# Patient Record
(demographics unavailable — no encounter records)

---

## 2024-11-01 NOTE — HISTORY OF PRESENT ILLNESS
[FreeTextEntry6] : cough, 4 days, no fever m sore throat , runny nose,, stuffy nose  mild bronchospasm  Albuterol HFA 2 puffs every 4 hours as needed

## 2024-11-02 NOTE — DISCUSSION/SUMMARY
[FreeTextEntry1] : 13 yo seen 3 days ago for cough Rx w Inhaler, did not help much PE afebrile volitional cough w expiratory Stridor Chest CTAB Suggest Humidifier Rx Medrol Dose pack If symptoms worsen or concerned, call/return to office. Questions answered.

## 2024-11-02 NOTE — PHYSICAL EXAM
[Acute Distress] : no acute distress [Alert] : alert [Clear to Auscultation Bilaterally] : clear to auscultation bilaterally [NL] : warm, clear [FreeTextEntry5] : Spectacles [de-identified] : Cough w expiratory stridor [FreeTextEntry7] : cough w expiratory stridor

## 2024-11-02 NOTE — PHYSICAL EXAM
[Acute Distress] : no acute distress [Alert] : alert [Clear to Auscultation Bilaterally] : clear to auscultation bilaterally [NL] : warm, clear [FreeTextEntry5] : Spectacles [de-identified] : Cough w expiratory stridor [FreeTextEntry7] : cough w expiratory stridor

## 2024-11-20 NOTE — RISK ASSESSMENT
[Grade: ____] : Grade: [unfilled] [Normal Performance] : normal performance [Normal Behavior/Attention] : normal behavior/attention [Eats regular meals including adequate fruits and vegetables] : eats regular meals including adequate fruits and vegetables [Has friends] : has friends [Uses tobacco] : does not use tobacco [Uses drugs] : does not use drugs  [Drinks alcohol] : does not drink alcohol [Home is free of violence] : home is free of violence [Has/had oral sex] : has not had oral sex [Has had sexual intercourse] : has not had sexual intercourse [Has ways to cope with stress] : has ways to cope with stress [Displays self-confidence] : displays self-confidence [Has problems with sleep] : does not have problems with sleep [Gets depressed, anxious, or irritable/has mood swings] : does not get depressed, anxious, or irritable/has mood swings [Has thought about hurting self or considered suicide] : has not thought about hurting self or considered suicide [With Teen] : teen [de-identified] : lives with mom, dad and 2 siblings- gets along well

## 2024-11-20 NOTE — DISCUSSION/SUMMARY
[FreeTextEntry1] : [facility] and [facility] obtained and reviewed; anticipatory guidance provided vis and consent given for menactra, hpv and flu vaccines rtc for vaccines